# Patient Record
Sex: FEMALE | Race: WHITE | URBAN - METROPOLITAN AREA
[De-identification: names, ages, dates, MRNs, and addresses within clinical notes are randomized per-mention and may not be internally consistent; named-entity substitution may affect disease eponyms.]

---

## 2024-06-20 NOTE — PROGRESS NOTES
[facility]  [address] [place], [place] [address] [address]  Phone: [phone]       Name: Venice Ash  : 1990     Chief Complaint:    Venice Ash is a 33 y.o. year old female who presents today for   Chief Complaint   Patient presents with    New Patient     Last seen 2020 at [facility] (she is a [employer] employee). [id number]    Dizziness       History of Present Illness:    New patient. Re-establishing care. I last saw her 2020.     Feeling dizzy off and on for past 2 weeks, did go to ER on , while was at work due to feeling fait at that time. She states she is just feeling \"off\" still. Happening about once per day. Goes away with rest. Eating or water help some. She takes a lot of supplements - probiotic, collagen, and some that do have caffeine. Doesn't drink much free water throughout the day. Eats small meals - doesn't eat many full meals throughout the day.     She has softer stools with more urgency since February.     Medications:    Outpatient Medications Prior to Visit   Medication Sig Dispense Refill    Multiple Vitamin (MULTIVITAMIN ADULT PO) Take 1 capsule by mouth daily      Segesterone-Ethinyl Estradiol (ANNOVERA) 0.15-0.013 MG/24HR RING Place 1 Application vaginally      Elastic Bandages & Supports (WRIST SPLINT/COCK-UP/LEFT M) MISC 1 each by Does not apply route daily 1 each 0    Elastic Bandages & Supports (WRIST SPLINT/COCK-UP/RIGHT M) MISC 1 each by Does not apply route daily 1 each 0    valACYclovir (VALTREX) 500 MG tablet Take one tablet by mouth twice daily for 7 days. 14 tablet 1    Ascorbic Acid (VITAMIN C) 500 MG tablet Take 500 mg by mouth daily       No facility-administered medications prior to visit.       Review of Systems:     Review of Systems     Physical Exam:     Vitals:  /82 (Site: Left Upper Arm, Position: Sitting, Cuff Size: Small Adult)   Pulse 73   Ht 1.651 m (5' 5\")   Wt 72.6 kg (160 lb)   LMP 2024 (Exact

## 2024-06-21 ENCOUNTER — OFFICE VISIT (OUTPATIENT)
Dept: PRIMARY CARE CLINIC | Age: 34
End: 2024-06-21
Payer: COMMERCIAL

## 2024-06-21 VITALS
HEART RATE: 73 BPM | SYSTOLIC BLOOD PRESSURE: 110 MMHG | OXYGEN SATURATION: 99 % | WEIGHT: 160 LBS | HEIGHT: 65 IN | BODY MASS INDEX: 26.66 KG/M2 | DIASTOLIC BLOOD PRESSURE: 82 MMHG

## 2024-06-21 DIAGNOSIS — E87.6 HYPOKALEMIA: ICD-10-CM

## 2024-06-21 DIAGNOSIS — R19.4 BOWEL HABIT CHANGES: ICD-10-CM

## 2024-06-21 DIAGNOSIS — R42 DIZZINESS: Primary | ICD-10-CM

## 2024-06-21 PROCEDURE — 99213 OFFICE O/P EST LOW 20 MIN: CPT | Performed by: FAMILY MEDICINE

## 2024-06-21 RX ORDER — SEGESTERONE ACETATE AND ETHINYL ESTRADIOL 103; 17.4 MG/1; MG/1
1 RING VAGINAL
COMMUNITY
Start: 2023-05-02 | End: 2024-06-21

## 2024-06-21 SDOH — ECONOMIC STABILITY: INCOME INSECURITY: HOW HARD IS IT FOR YOU TO PAY FOR THE VERY BASICS LIKE FOOD, HOUSING, MEDICAL CARE, AND HEATING?: NOT HARD AT ALL

## 2024-06-21 SDOH — ECONOMIC STABILITY: HOUSING INSECURITY
IN THE LAST 12 MONTHS, WAS THERE A TIME WHEN YOU DID NOT HAVE A STEADY PLACE TO SLEEP OR SLEPT IN A SHELTER (INCLUDING NOW)?: NO

## 2024-06-21 SDOH — ECONOMIC STABILITY: FOOD INSECURITY: WITHIN THE PAST 12 MONTHS, YOU WORRIED THAT YOUR FOOD WOULD RUN OUT BEFORE YOU GOT MONEY TO BUY MORE.: NEVER TRUE

## 2024-06-21 SDOH — ECONOMIC STABILITY: FOOD INSECURITY: WITHIN THE PAST 12 MONTHS, THE FOOD YOU BOUGHT JUST DIDN'T LAST AND YOU DIDN'T HAVE MONEY TO GET MORE.: NEVER TRUE

## 2024-06-21 ASSESSMENT — PATIENT HEALTH QUESTIONNAIRE - PHQ9
SUM OF ALL RESPONSES TO PHQ QUESTIONS 1-9: 0
2. FEELING DOWN, DEPRESSED OR HOPELESS: NOT AT ALL
SUM OF ALL RESPONSES TO PHQ QUESTIONS 1-9: 0
1. LITTLE INTEREST OR PLEASURE IN DOING THINGS: NOT AT ALL
SUM OF ALL RESPONSES TO PHQ9 QUESTIONS 1 & 2: 0

## 2024-06-26 ENCOUNTER — OFFICE VISIT (OUTPATIENT)
Dept: PRIMARY CARE CLINIC | Age: 34
End: 2024-06-26
Payer: COMMERCIAL

## 2024-06-26 ENCOUNTER — TELEPHONE (OUTPATIENT)
Dept: PRIMARY CARE CLINIC | Age: 34
End: 2024-06-26

## 2024-06-26 VITALS
HEART RATE: 84 BPM | HEIGHT: 65 IN | BODY MASS INDEX: 26.66 KG/M2 | DIASTOLIC BLOOD PRESSURE: 110 MMHG | WEIGHT: 160 LBS | SYSTOLIC BLOOD PRESSURE: 142 MMHG

## 2024-06-26 DIAGNOSIS — R03.0 ELEVATED BLOOD PRESSURE READING: ICD-10-CM

## 2024-06-26 DIAGNOSIS — R55 PRE-SYNCOPE: Primary | ICD-10-CM

## 2024-06-26 PROCEDURE — 99214 OFFICE O/P EST MOD 30 MIN: CPT | Performed by: FAMILY MEDICINE

## 2024-06-26 NOTE — TELEPHONE ENCOUNTER
Pt calling to update Dr Rodriguez on her appt she scheduled with cardiology. States she was able to get into Dr Schumacher's office in [place] to see the NP on 7/2/24 at 1:30pm

## 2024-06-26 NOTE — PATIENT INSTRUCTIONS
[facility]      Ph. [phone]  [address] [place] [address]         Dr. Ronak Schumacher        Ph. [phone]  [address] [facility] [place] [address]

## 2024-07-01 ENCOUNTER — TELEPHONE (OUTPATIENT)
Dept: PRIMARY CARE CLINIC | Age: 34
End: 2024-07-01

## 2024-07-01 DIAGNOSIS — R42 DIZZINESS: Primary | ICD-10-CM

## 2024-07-01 NOTE — TELEPHONE ENCOUNTER
Patient requesting an ENT referral as she believes that the dizziness may be an inner ear issue.   Patient did not specify a Dr. but requests a [facility] facility.

## 2024-07-12 ENCOUNTER — TELEPHONE (OUTPATIENT)
Dept: PRIMARY CARE CLINIC | Age: 34
End: 2024-07-12

## 2024-07-12 DIAGNOSIS — R42 DIZZINESS: Primary | ICD-10-CM

## 2024-07-12 NOTE — TELEPHONE ENCOUNTER
Otolaryngology has recommended she gets a referral for neology instead of an ENT for her dizziness.  She is wanting to know if one could be sent.

## 2024-07-18 ENCOUNTER — TELEPHONE (OUTPATIENT)
Dept: PRIMARY CARE CLINIC | Age: 34
End: 2024-07-18

## 2024-07-18 NOTE — TELEPHONE ENCOUNTER
Patient requesting an update about [facility] paperwork that she dropped off last week. Informed patient that Dr. Rodriguez, nor her MA are in office today.   I informed patient that the paperwork that was complete 6/27 has been signed and faxed.   I was unsure if there was another set of paperwork or not. Routing to Dr. Rodriguez's MA.

## 2024-07-19 NOTE — TELEPHONE ENCOUNTER
We did receive new/additional FMLA for her spouse, it is on desk for Dr Rodriguez to address when she returns

## 2024-08-22 ENCOUNTER — TELEPHONE (OUTPATIENT)
Dept: PRIMARY CARE CLINIC | Age: 34
End: 2024-08-22

## 2024-08-22 NOTE — TELEPHONE ENCOUNTER
Pt called asking if paperwork for FMLA for spouse was completed.    I advised her the papers were ready to be picked up.    She then asked if they were completed and faxed.     I advised the note stated had to be completed by specialist and she said she had told us her specialist will not fill them out they told her it was up to PCP.    Please advise. Pt will call back tomorrow for an answer.

## 2024-08-23 NOTE — TELEPHONE ENCOUNTER
I have not seen her since she started seeing the specialists - per cardiology's note there's not an indication for FMLA paperwork or disability.     So - what has she been diagnosed with from specialists and how much work is she missing? I can't really go against a specialist's diagnosis and recommendations when they're the ones managing this. But I know she is seeing a couple other specialists as well.

## 2024-08-30 NOTE — PROGRESS NOTES
[facility]  [address] [place] [address]  Phone: [phone]       Name: Venice Ash  : 1990     Chief Complaint:    Venice Ash is a 34 y.o. year old female who presents today for   Chief Complaint   Patient presents with    Dizziness       History of Present Illness:    Discuss FMLA for her . Has seen cardiology and has completed a tilt test and will be referred to Dr. Stephens. Sees cardio again next week. Sees [facility] .     Having flare ups still - tachycardia and presyncope. Has to lay down and rest. Episodes happening 1-3 times per month. Lasts half to full day for work. She is concerned because she is unable to care for her children during flare ups. So her  has to miss work for these. She is requesting FMLA for her  to care for her children and/or her during her flare ups.     Medications:    Outpatient Medications Prior to Visit   Medication Sig Dispense Refill    MAGNESIUM PO Take by mouth       No facility-administered medications prior to visit.       Review of Systems:     Review of Systems     Physical Exam:     Vitals:  /76 (Site: Left Upper Arm, Position: Sitting, Cuff Size: Small Adult)   Pulse 66   Ht 1.651 m (5' 5\")   Wt 74.4 kg (164 lb)   LMP  (LMP Unknown)   SpO2 96%   BMI 27.29 kg/m²  Body mass index is 27.29 kg/m².    Physical Exam  Vitals and nursing note reviewed.   Constitutional:       Appearance: Normal appearance.   Cardiovascular:      Rate and Rhythm: Normal rate.   Pulmonary:      Effort: Pulmonary effort is normal.   Neurological:      General: No focal deficit present.      Mental Status: She is alert.   Psychiatric:         Mood and Affect: Mood normal.         Behavior: Behavior normal.         Thought Content: Thought content normal.         Judgment: Judgment normal.         Assessment:      Diagnosis Orders   1. POTS (postural orthostatic tachycardia syndrome)                  Plan:     1. POTS (postural  Urinary tract infection

## 2024-09-03 ENCOUNTER — OFFICE VISIT (OUTPATIENT)
Dept: PRIMARY CARE CLINIC | Age: 34
End: 2024-09-03
Payer: COMMERCIAL

## 2024-09-03 VITALS
OXYGEN SATURATION: 96 % | DIASTOLIC BLOOD PRESSURE: 76 MMHG | WEIGHT: 164 LBS | HEART RATE: 66 BPM | BODY MASS INDEX: 27.32 KG/M2 | SYSTOLIC BLOOD PRESSURE: 102 MMHG | HEIGHT: 65 IN

## 2024-09-03 DIAGNOSIS — G90.A POTS (POSTURAL ORTHOSTATIC TACHYCARDIA SYNDROME): Primary | ICD-10-CM

## 2024-09-03 PROCEDURE — 99213 OFFICE O/P EST LOW 20 MIN: CPT | Performed by: FAMILY MEDICINE
